# Patient Record
Sex: MALE | Race: WHITE | URBAN - METROPOLITAN AREA
[De-identification: names, ages, dates, MRNs, and addresses within clinical notes are randomized per-mention and may not be internally consistent; named-entity substitution may affect disease eponyms.]

---

## 2020-08-25 ENCOUNTER — NURSE TRIAGE (OUTPATIENT)
Dept: OTHER | Facility: OTHER | Age: 60
End: 2020-08-25

## 2020-08-25 DIAGNOSIS — Z11.59 SPECIAL SCREENING EXAMINATION FOR VIRAL DISEASE: Primary | ICD-10-CM

## 2020-08-25 DIAGNOSIS — Z11.59 SPECIAL SCREENING EXAMINATION FOR VIRAL DISEASE: ICD-10-CM

## 2020-08-25 PROCEDURE — U0003 INFECTIOUS AGENT DETECTION BY NUCLEIC ACID (DNA OR RNA); SEVERE ACUTE RESPIRATORY SYNDROME CORONAVIRUS 2 (SARS-COV-2) (CORONAVIRUS DISEASE [COVID-19]), AMPLIFIED PROBE TECHNIQUE, MAKING USE OF HIGH THROUGHPUT TECHNOLOGIES AS DESCRIBED BY CMS-2020-01-R: HCPCS | Performed by: FAMILY MEDICINE

## 2020-08-25 NOTE — TELEPHONE ENCOUNTER
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  Pt has not had exposure   2  PLACE of CONTACT: "Where were you when you were exposed to COVID-19?" (e g , home, school, medical waiting room; which city?)     unknown  3  TYPE of CONTACT: "How much contact was there?" (e g , sitting next to, live in same house, work in same office, same building)      unknown  4  DURATION of CONTACT: "How long were you in contact with the COVID-19 patient?" (e g , a few seconds, passed by person, a few minutes, live with the patient)     unknown  5  DATE of CONTACT: "When did you have contact with a COVID-19 patient?" (e g , how many days ago)      unknown  6  TRAVEL: "Have you traveled out of the country recently?" If so, "When and where?"      * Also ask about out-of-state travel, since the CDC has identified some high-risk cities for community spread in the [address]  * Note: Travel becomes less relevant if there is widespread community transmission where the patient lives  Travels to [place] daily for work  7  COMMUNITY SPREAD: "Are there lots of cases of COVID-19 (community spread) where you live?" (See public health department website, if unsure)       Lives in [place]  8  SYMPTOMS: "Do you have any symptoms?" (e g , fever, cough, breathing difficulty)      none  Pt only desires screening for his own knowledge   Has not had exposure at this point    Protocols used: CORONAVIRUS (COVID-19) EXPOSURE-ADULT-[place]

## 2020-08-25 NOTE — TELEPHONE ENCOUNTER
Regarding: Covid test  ----- Message from Benny Montes sent at 8/25/2020 10:17 AM EDT -----  "I need to get Covid tested for work "

## 2020-08-26 LAB — SARS-COV-2 RNA SPEC QL NAA+PROBE: NOT DETECTED

## 2024-05-01 ENCOUNTER — APPOINTMENT (OUTPATIENT)
Dept: RADIOLOGY | Facility: CLINIC | Age: 64
End: 2024-05-01
Payer: COMMERCIAL

## 2024-05-01 ENCOUNTER — TELEPHONE (OUTPATIENT)
Dept: ADMINISTRATIVE | Facility: OTHER | Age: 64
End: 2024-05-01

## 2024-05-01 ENCOUNTER — APPOINTMENT (OUTPATIENT)
Dept: LAB | Facility: CLINIC | Age: 64
End: 2024-05-01
Payer: COMMERCIAL

## 2024-05-01 ENCOUNTER — OFFICE VISIT (OUTPATIENT)
Dept: FAMILY MEDICINE CLINIC | Facility: CLINIC | Age: 64
End: 2024-05-01
Payer: COMMERCIAL

## 2024-05-01 ENCOUNTER — TELEPHONE (OUTPATIENT)
Dept: FAMILY MEDICINE CLINIC | Facility: CLINIC | Age: 64
End: 2024-05-01

## 2024-05-01 VITALS
DIASTOLIC BLOOD PRESSURE: 100 MMHG | SYSTOLIC BLOOD PRESSURE: 140 MMHG | TEMPERATURE: 98.2 F | WEIGHT: 220 LBS | HEART RATE: 92 BPM | HEIGHT: 68 IN | OXYGEN SATURATION: 99 % | BODY MASS INDEX: 33.34 KG/M2

## 2024-05-01 DIAGNOSIS — M25.561 CHRONIC PAIN OF RIGHT KNEE: ICD-10-CM

## 2024-05-01 DIAGNOSIS — Z98.84 BARIATRIC SURGERY STATUS: ICD-10-CM

## 2024-05-01 DIAGNOSIS — G89.29 CHRONIC PAIN OF RIGHT KNEE: ICD-10-CM

## 2024-05-01 DIAGNOSIS — R03.0 ELEVATED BLOOD PRESSURE READING WITHOUT DIAGNOSIS OF HYPERTENSION: Primary | ICD-10-CM

## 2024-05-01 DIAGNOSIS — Z13.220 NEED FOR LIPID SCREENING: ICD-10-CM

## 2024-05-01 DIAGNOSIS — Z12.5 SCREENING FOR PROSTATE CANCER: ICD-10-CM

## 2024-05-01 LAB
25(OH)D3 SERPL-MCNC: 40.6 NG/ML (ref 30–100)
BASOPHILS # BLD AUTO: 0.04 THOUSANDS/ÂΜL (ref 0–0.1)
BASOPHILS NFR BLD AUTO: 1 % (ref 0–1)
CHOLEST SERPL-MCNC: 178 MG/DL
EOSINOPHIL # BLD AUTO: 0.33 THOUSAND/ÂΜL (ref 0–0.61)
EOSINOPHIL NFR BLD AUTO: 6 % (ref 0–6)
ERYTHROCYTE [DISTWIDTH] IN BLOOD BY AUTOMATED COUNT: 12.1 % (ref 11.6–15.1)
FERRITIN SERPL-MCNC: 121 NG/ML (ref 24–336)
FOLATE SERPL-MCNC: 20.8 NG/ML
HCT VFR BLD AUTO: 45.9 % (ref 36.5–49.3)
HDLC SERPL-MCNC: 72 MG/DL
HGB BLD-MCNC: 15.5 G/DL (ref 12–17)
IMM GRANULOCYTES # BLD AUTO: 0.02 THOUSAND/UL (ref 0–0.2)
IMM GRANULOCYTES NFR BLD AUTO: 0 % (ref 0–2)
IRON SERPL-MCNC: 87 UG/DL (ref 50–212)
LDLC SERPL CALC-MCNC: 96 MG/DL (ref 0–100)
LYMPHOCYTES # BLD AUTO: 1.4 THOUSANDS/ÂΜL (ref 0.6–4.47)
LYMPHOCYTES NFR BLD AUTO: 24 % (ref 14–44)
MCH RBC QN AUTO: 31.6 PG (ref 26.8–34.3)
MCHC RBC AUTO-ENTMCNC: 33.8 G/DL (ref 31.4–37.4)
MCV RBC AUTO: 94 FL (ref 82–98)
MONOCYTES # BLD AUTO: 0.65 THOUSAND/ÂΜL (ref 0.17–1.22)
MONOCYTES NFR BLD AUTO: 11 % (ref 4–12)
NEUTROPHILS # BLD AUTO: 3.32 THOUSANDS/ÂΜL (ref 1.85–7.62)
NEUTS SEG NFR BLD AUTO: 58 % (ref 43–75)
NONHDLC SERPL-MCNC: 106 MG/DL
NRBC BLD AUTO-RTO: 0 /100 WBCS
PLATELET # BLD AUTO: 268 THOUSANDS/UL (ref 149–390)
PMV BLD AUTO: 9.7 FL (ref 8.9–12.7)
PSA SERPL-MCNC: 4 NG/ML (ref 0–4)
PTH-INTACT SERPL-MCNC: 84.9 PG/ML (ref 12–88)
RBC # BLD AUTO: 4.9 MILLION/UL (ref 3.88–5.62)
TRIGL SERPL-MCNC: 48 MG/DL
TSH SERPL DL<=0.05 MIU/L-ACNC: 2.02 UIU/ML (ref 0.45–4.5)
VIT B12 SERPL-MCNC: 1355 PG/ML (ref 180–914)
WBC # BLD AUTO: 5.76 THOUSAND/UL (ref 4.31–10.16)

## 2024-05-01 PROCEDURE — 83540 ASSAY OF IRON: CPT

## 2024-05-01 PROCEDURE — 82306 VITAMIN D 25 HYDROXY: CPT

## 2024-05-01 PROCEDURE — 3725F SCREEN DEPRESSION PERFORMED: CPT | Performed by: FAMILY MEDICINE

## 2024-05-01 PROCEDURE — 84425 ASSAY OF VITAMIN B-1: CPT

## 2024-05-01 PROCEDURE — 80053 COMPREHEN METABOLIC PANEL: CPT

## 2024-05-01 PROCEDURE — G0103 PSA SCREENING: HCPCS

## 2024-05-01 PROCEDURE — 82746 ASSAY OF FOLIC ACID SERUM: CPT

## 2024-05-01 PROCEDURE — 36415 COLL VENOUS BLD VENIPUNCTURE: CPT

## 2024-05-01 PROCEDURE — 85025 COMPLETE CBC W/AUTO DIFF WBC: CPT

## 2024-05-01 PROCEDURE — 82607 VITAMIN B-12: CPT

## 2024-05-01 PROCEDURE — 82728 ASSAY OF FERRITIN: CPT

## 2024-05-01 PROCEDURE — 83970 ASSAY OF PARATHORMONE: CPT

## 2024-05-01 PROCEDURE — 84630 ASSAY OF ZINC: CPT

## 2024-05-01 PROCEDURE — 73562 X-RAY EXAM OF KNEE 3: CPT

## 2024-05-01 PROCEDURE — 84590 ASSAY OF VITAMIN A: CPT

## 2024-05-01 PROCEDURE — 99204 OFFICE O/P NEW MOD 45 MIN: CPT | Performed by: FAMILY MEDICINE

## 2024-05-01 PROCEDURE — 84443 ASSAY THYROID STIM HORMONE: CPT

## 2024-05-01 PROCEDURE — 80061 LIPID PANEL: CPT

## 2024-05-01 RX ORDER — MULTIVITAMIN
1 TABLET ORAL DAILY
COMMUNITY

## 2024-05-01 RX ORDER — FERROUS SULFATE 325(65) MG
325 TABLET ORAL
COMMUNITY

## 2024-05-01 RX ORDER — CHOLECALCIFEROL (VITAMIN D3) 125 MCG
500 CAPSULE ORAL DAILY
COMMUNITY

## 2024-05-01 NOTE — TELEPHONE ENCOUNTER
----- Message from Deloris Crane MA sent at 5/1/2024  9:42 AM EDT -----  Regarding: Care Gap Request  05/01/24 9:42 AM    Hello, our patient Vitaliy Tripp has had CRC: Colonoscopy completed/performed. Please assist in updating the patient chart by making an External outreach to Dr. Sevilla facility located in [place]. The date of service is with in the past few years.    Thank you,  Deloris Crane  [facility]

## 2024-05-01 NOTE — LETTER
Procedure Request Form: Colonoscopy      Date Requested: 24  Patient: Vitaliy Tripp  Patient : 1960   Referring Provider: Blayne Caraballo MD        Date of Procedure ______________________________       The above patient has informed us that they have completed their   most recent Colonoscopy at your facility. Please complete   this form and attach all corresponding procedure reports/results.    Comments __________________________________________________________  ____________________________________________________________________  ____________________________________________________________________  ____________________________________________________________________    Facility Completing Procedure _________________________________________    Form Completed By (print name) _______________________________________      Signature __________________________________________________________      These reports are needed for  compliance.    Please fax this completed form and a copy of the procedure report to our office located at [address] [place] [address] as soon as possible to Fax [phone] attention Karen: Phone [phone]    We thank you for your assistance in treating our mutual patient.

## 2024-05-01 NOTE — PROGRESS NOTES
Name: Vitaliy Tripp      : 1960      MRN: [id number]  Encounter Provider: Blayne Caraballo MD  Encounter Date: 2024   Encounter department: [facility]    Assessment & Plan     1. Elevated blood pressure reading without diagnosis of hypertension  Elevated blood pressure reading  Advise to measure BP daily  If elevated above 140/90 mmhg recommend medications    2. Bariatric surgery status  -     PTH, intact; Future; Expected date: 2024  -     Iron; Future; Expected date: 2024  -     Folate; Future; Expected date: 2024  -     Ferritin; Future; Expected date: 2024  -     Comprehensive metabolic panel; Future; Expected date: 2024  -     CBC and differential; Future; Expected date: 2024  -     Vitamin A; Future; Expected date: 2024  -     Vitamin B12; Future; Expected date: 2024  -     Vitamin B1, whole blood; Future; Expected date: 2024  -     Vitamin D 25 hydroxy; Future; Expected date: 2024  -     Zinc; Future; Expected date: 2024  -     TSH, 3rd generation with Free T4 reflex; Future; Expected date: 2024    3. Chronic pain of right knee  -     XR knee 3 vw right non injury; Future; Expected date: 2024    4. Need for lipid screening  -     Lipid panel; Future; Expected date: 2024    5. Screening for prostate cancer  -     PSA, Total Screen; Future; Expected date: 2024    Follow up in 1-2 weeks for a BP check       Subjective     Patient is here to establish care.  Has a hx of bariatric surgery.  Also has elevated BP denies any symptoms related to this never had HTN in the past.  Also has chronic right knee pain denies any knee swelling. Able to apply full weight to his right knee.      Review of Systems   Constitutional:  Negative for activity change, appetite change, fatigue and fever.   HENT:  Negative for congestion and ear discharge.    Respiratory:  Negative for cough and shortness of breath.     Cardiovascular:  Negative for chest pain and palpitations.   Gastrointestinal:  Negative for diarrhea and nausea.   Musculoskeletal:  Positive for arthralgias. Negative for back pain.   Skin:  Negative for color change and rash.   Neurological:  Negative for dizziness and headaches.   Psychiatric/Behavioral:  Negative for agitation and behavioral problems.        Past Medical History:   Diagnosis Date    Anemia      Past Surgical History:   Procedure Laterality Date    CHOLECYSTECTOMY      GASTRIC BYPASS      Y     History reviewed. No pertinent family history.  Social History     Socioeconomic History    Marital status: Single     Spouse name: None    Number of children: None    Years of education: None    Highest education level: None   Occupational History    None   Tobacco Use    Smoking status: Never    Smokeless tobacco: Never   Vaping Use    Vaping status: Never Used   Substance and Sexual Activity    Alcohol use: Not Currently     Comment: socially    Drug use: Never    Sexual activity: None   Other Topics Concern    None   Social History Narrative    None     Social Determinants of Health     Financial Resource Strain: Not on file   Food Insecurity: Not on file   Transportation Needs: Not on file   Physical Activity: Not on file   Stress: Not on file   Social Connections: Not on file   Intimate Partner Violence: Not on file   Housing Stability: Not on file     Current Outpatient Medications on File Prior to Visit   Medication Sig    ferrous sulfate 325 (65 Fe) mg tablet Take 325 mg by mouth daily with breakfast    Multiple Vitamin (multivitamin) tablet Take 1 tablet by mouth daily    vitamin B-12 (VITAMIN B-12) 500 mcg tablet Take 500 mcg by mouth daily     No Known Allergies  Immunization History   Administered Date(s) Administered    COVID-19 Pfizer vac (Jim-sucrose, gray cap) 12 yr+ IM 03/07/2022       Objective     BP (!) 160/102 (BP Location: Left arm, Patient Position: Sitting, Cuff Size: Large)    "Pulse 92   Temp 98.2 °F (36.8 °C)   Ht 5' 8\" (1.727 m)   Wt 99.8 kg (220 lb)   SpO2 99%   BMI 33.45 kg/m²     Physical Exam  Constitutional:       General: He is not in acute distress.     Appearance: He is well-developed. He is not diaphoretic.   Eyes:      General: No scleral icterus.     Pupils: Pupils are equal, round, and reactive to light.   Cardiovascular:      Rate and Rhythm: Normal rate and regular rhythm.      Heart sounds: Normal heart sounds. No murmur heard.  Pulmonary:      Effort: Pulmonary effort is normal. No respiratory distress.      Breath sounds: Normal breath sounds. No wheezing.   Abdominal:      General: Bowel sounds are normal. There is no distension.      Palpations: Abdomen is soft.      Tenderness: There is no abdominal tenderness.   Musculoskeletal:         General: Tenderness present.      Comments: FROM of right knee, no effusion noted  Able to apply full weight on it.   Skin:     General: Skin is warm and dry.      Findings: No rash.   Neurological:      Mental Status: He is alert and oriented to person, place, and time.       Blayne Caraballo MD    "

## 2024-05-01 NOTE — TELEPHONE ENCOUNTER
Blayne Caraballo MD  5/1/2024  1:02 PM EDT Back to Top      Right knee shows evidence of fluid no significant arthritis recommend to F/U with orthopedic.

## 2024-05-03 LAB — ZINC SERPL-MCNC: 77 UG/DL (ref 44–115)

## 2024-05-03 NOTE — TELEPHONE ENCOUNTER
Upon review of the In Basket request and the patient's chart, initial outreach has been made via fax to facility. Please see Contacts section for details.     Thank you  Karen Mckenna MA     Fax [phone]     [phone]

## 2024-05-05 LAB — VIT B1 BLD-SCNC: 93.5 NMOL/L (ref 66.5–200)

## 2024-05-06 LAB — VIT A SERPL-MCNC: 52.5 UG/DL (ref 22–69.5)

## 2024-05-08 LAB
ALBUMIN SERPL BCP-MCNC: 4.4 G/DL (ref 3.5–5)
ALP SERPL-CCNC: 61 U/L (ref 34–104)
ALT SERPL W P-5'-P-CCNC: 21 U/L (ref 7–52)
ANION GAP SERPL CALCULATED.3IONS-SCNC: 9 MMOL/L (ref 4–13)
AST SERPL W P-5'-P-CCNC: 25 U/L (ref 13–39)
BILIRUB SERPL-MCNC: 0.7 MG/DL (ref 0.2–1)
BUN SERPL-MCNC: 9 MG/DL (ref 5–25)
CALCIUM SERPL-MCNC: 9.7 MG/DL (ref 8.4–10.2)
CHLORIDE SERPL-SCNC: 104 MMOL/L (ref 96–108)
CO2 SERPL-SCNC: 28 MMOL/L (ref 21–32)
CREAT SERPL-MCNC: 0.72 MG/DL (ref 0.6–1.3)
GFR SERPL CREATININE-BSD FRML MDRD: 99 ML/MIN/1.73SQ M
GLUCOSE P FAST SERPL-MCNC: 93 MG/DL (ref 65–99)
POTASSIUM SERPL-SCNC: 4.3 MMOL/L (ref 3.5–5.3)
PROT SERPL-MCNC: 7.7 G/DL (ref 6.4–8.4)
SODIUM SERPL-SCNC: 141 MMOL/L (ref 135–147)

## 2024-05-09 NOTE — TELEPHONE ENCOUNTER
Upon review of the In Basket request we were able to locate, review, and update the patient chart as requested for CRC: Colonoscopy.    Any additional questions or concerns should be emailed to the Practice Liaisons via the appropriate education email address, please do not reply via In Basket.    Thank you  Karen Mckenna MA

## 2024-07-09 VITALS
SYSTOLIC BLOOD PRESSURE: 176 MMHG | BODY MASS INDEX: 31.22 KG/M2 | DIASTOLIC BLOOD PRESSURE: 113 MMHG | HEART RATE: 84 BPM | WEIGHT: 206 LBS | HEIGHT: 68 IN

## 2024-07-09 DIAGNOSIS — M25.461 EFFUSION OF RIGHT KNEE: Primary | ICD-10-CM

## 2024-07-09 DIAGNOSIS — M25.561 ACUTE PAIN OF RIGHT KNEE: ICD-10-CM

## 2024-07-09 LAB
CRYSTALS SNV QL MICRO: NORMAL
LYMPHOCYTES # SNV MANUAL: 23 %
MONOCYTES NFR SNV MANUAL: 76 %
NEUTROPHILS NFR SNV MANUAL: 1 %
TOTAL CELLS COUNTED SPEC: 100
WBC # FLD MANUAL: 173 /UL (ref 0–200)

## 2024-07-09 PROCEDURE — 89051 BODY FLUID CELL COUNT: CPT | Performed by: ORTHOPAEDIC SURGERY

## 2024-07-09 PROCEDURE — 87476 LYME DIS DNA AMP PROBE: CPT | Performed by: ORTHOPAEDIC SURGERY

## 2024-07-09 PROCEDURE — 20610 DRAIN/INJ JOINT/BURSA W/O US: CPT | Performed by: ORTHOPAEDIC SURGERY

## 2024-07-09 PROCEDURE — 99204 OFFICE O/P NEW MOD 45 MIN: CPT | Performed by: ORTHOPAEDIC SURGERY

## 2024-07-09 PROCEDURE — 89060 EXAM SYNOVIAL FLUID CRYSTALS: CPT | Performed by: ORTHOPAEDIC SURGERY

## 2024-07-09 RX ORDER — LIDOCAINE HYDROCHLORIDE 10 MG/ML
2 INJECTION, SOLUTION INFILTRATION; PERINEURAL
Status: COMPLETED | OUTPATIENT
Start: 2024-07-09 | End: 2024-07-09

## 2024-07-09 RX ORDER — BUPIVACAINE HYDROCHLORIDE 2.5 MG/ML
2 INJECTION, SOLUTION INFILTRATION; PERINEURAL
Status: COMPLETED | OUTPATIENT
Start: 2024-07-09 | End: 2024-07-09

## 2024-07-09 RX ORDER — METHYLPREDNISOLONE ACETATE 40 MG/ML
2 INJECTION, SUSPENSION INTRA-ARTICULAR; INTRALESIONAL; INTRAMUSCULAR; SOFT TISSUE
Status: COMPLETED | OUTPATIENT
Start: 2024-07-09 | End: 2024-07-09

## 2024-07-09 RX ADMIN — LIDOCAINE HYDROCHLORIDE 2 ML: 10 INJECTION, SOLUTION INFILTRATION; PERINEURAL at 11:00

## 2024-07-09 RX ADMIN — METHYLPREDNISOLONE ACETATE 2 ML: 40 INJECTION, SUSPENSION INTRA-ARTICULAR; INTRALESIONAL; INTRAMUSCULAR; SOFT TISSUE at 11:00

## 2024-07-09 RX ADMIN — BUPIVACAINE HYDROCHLORIDE 2 ML: 2.5 INJECTION, SOLUTION INFILTRATION; PERINEURAL at 11:00

## 2024-07-09 NOTE — PROGRESS NOTES
Patient Name:  Vitaliy Tripp  MRN:  [id number]    Assessment & Plan     1. Effusion of right knee  -     Large joint arthrocentesis: R knee  -     Synovial fluid, crystal; Future  -     Synovial fluid white cell count w/ diff; Future  -     Lyme disease, PCR; Future  -     Synovial fluid, crystal  -     Synovial fluid white cell count w/ diff  -     Lyme disease, PCR  2. Acute pain of right knee    Right knee atraumatic effusion with improving pain  X-rays reviewed in office today with patient  The patient was offered an aspiration and corticosteroid injection for their right knee. They tolerated the procedure well.    Fluid was sent for laboratory analysis for lyme disease, synovial white cell, and synovial crystal testing  The patient was educated they may have some irritation in the next few days and should rest, ice, elevate and perform gentle range of motion exercises. They were advised the medicine should begin to work in a few days time.    The patient was informed corticosteroid injections can be repeated at the earliest every 3 months.  Follow up 3-4 months    Chief Complaint     Right knee pain    History of the Present Illness     Vitaliy Tripp is a 64 y.o. male with Right knee pain since the end of April without injury. He thinks he may have injured it walking up the stairs. Pain is in his anterior knee. Initially his knee was swollen, but this has improved. Pain is managed with Tylenol and Voltaren gel. Over the past week, pain has improved. He has never been treated for knee pain in the past. He denies mechanical locking in his knee. He admits to feeling pins and needle sensation in his ankle. He has difficulty kneeling due to pain. The patient denies fevers, chills, bug bites. He has no history of gout.    Review of Systems     Review of Systems   Constitutional:  Negative for chills and fever.   HENT:  Negative for ear pain and sore throat.    Eyes:  Negative for pain and visual disturbance.  "  Respiratory:  Negative for cough and shortness of breath.    Cardiovascular:  Negative for chest pain and palpitations.   Gastrointestinal:  Negative for abdominal pain and vomiting.   Genitourinary:  Negative for dysuria and hematuria.   Musculoskeletal:  Negative for arthralgias and back pain.   Skin:  Negative for color change and rash.   Neurological:  Negative for seizures and syncope.   All other systems reviewed and are negative.      Physical Exam     BP (!) 176/113   Pulse 84   Ht 5' 8\" (1.727 m)   Wt 93.4 kg (206 lb)   BMI 31.32 kg/m²     Right Knee  Range of motion from 3 to 110.    There is no crepitus with range of motion.   There is small effusion.    There is mild tenderness over the medial joint line.    There is 5/5 quadriceps strength and equal tone.    The patient is able to perform a straight leg raise.      negative patellar grind test.  Anterior drawer tests is negative  negative Lachman Test.   Posterior drawer test is   negative   Varus stress testing reveals no instability at 0 and 30 degrees   Valgus stress testing reveals no instability at 0 and 30 degrees  Ruddy's testing is negative   The patient is neurovascular intact distally.      Eyes:  Anicteric sclerae.  Neck:  Supple.  Lungs:  Normal respiratory effort.  Cardiovascular:  Capillary refill is less than 2 seconds.  Skin:  Intact without erythema.  Neurologic:  Sensation grossly intact to light touch.  Psychiatric:  Mood and affect are appropriate.    Data Review     I have personally reviewed pertinent films in PACS, and my interpretation follows:    X-rays taken 5/1/2024 of Right knee independently reviewed and demonstrate cortical irregularity inferior patella. No acute fracture or dislocation. Minimal lateral and patellofemoral degenerative changes.     Past Medical History:   Diagnosis Date    Anemia        Past Surgical History:   Procedure Laterality Date    CHOLECYSTECTOMY      GASTRIC BYPASS      Y       No Known " "Allergies    Current Outpatient Medications on File Prior to Visit   Medication Sig Dispense Refill    Acetaminophen (TYLENOL PO) Take by mouth      Diclofenac Sodium (VOLTAREN PO) Take by mouth prn      ferrous sulfate 325 (65 Fe) mg tablet Take 325 mg by mouth daily with breakfast      Multiple Vitamin (multivitamin) tablet Take 1 tablet by mouth daily      vitamin B-12 (VITAMIN B-12) 500 mcg tablet Take 500 mcg by mouth daily       No current facility-administered medications on file prior to visit.       Social History     Tobacco Use    Smoking status: Never    Smokeless tobacco: Never   Vaping Use    Vaping status: Never Used   Substance Use Topics    Alcohol use: Not Currently     Comment: socially    Drug use: Never       History reviewed. No pertinent family history.          Procedures Performed     Large joint arthrocentesis: R knee  Universal Protocol:  Consent: Verbal consent obtained.  Risks and benefits: risks, benefits and alternatives were discussed  Consent given by: patient  Time out: Immediately prior to procedure a \"time out\" was called to verify the correct patient, procedure, equipment, support staff and site/side marked as required.  Patient understanding: patient states understanding of the procedure being performed  Site marked: the operative site was marked  Patient identity confirmed: verbally with patient  Supporting Documentation  Indications: pain   Procedure Details  Location: knee - R knee  Preparation: Patient was prepped and draped in the usual sterile fashion  Needle size: 22 G  Ultrasound guidance: no  Approach: anterolateral  Medications administered: 2 mL bupivacaine 0.25 %; 2 mL methylPREDNISolone acetate 40 mg/mL; 2 mL lidocaine 1 %    Aspirate amount: 35 mL  Aspirate: clear, serous and yellow  Patient tolerance: patient tolerated the procedure well with no immediate complications  Dressing:  Sterile dressing applied              DO Ailyn Alvarado Attestation  "     I,:  Luz Be am acting as a scribe while in the presence of the attending physician.:       I,:  Carlitos Tuttle, DO personally performed the services described in this documentation    as scribed in my presence.:

## 2024-07-12 LAB — B BURGDOR DNA SPEC QL NAA+PROBE: NEGATIVE

## 2025-03-25 ENCOUNTER — TELEPHONE (OUTPATIENT)
Dept: FAMILY MEDICINE CLINIC | Facility: CLINIC | Age: 65
End: 2025-03-25